# Patient Record
Sex: MALE | Race: WHITE | NOT HISPANIC OR LATINO | Employment: STUDENT | URBAN - METROPOLITAN AREA
[De-identification: names, ages, dates, MRNs, and addresses within clinical notes are randomized per-mention and may not be internally consistent; named-entity substitution may affect disease eponyms.]

---

## 2021-03-28 ENCOUNTER — TRANSFERRED RECORDS (OUTPATIENT)
Dept: HEALTH INFORMATION MANAGEMENT | Facility: CLINIC | Age: 21
End: 2021-03-28

## 2021-03-30 ENCOUNTER — TRANSFERRED RECORDS (OUTPATIENT)
Dept: HEALTH INFORMATION MANAGEMENT | Facility: CLINIC | Age: 21
End: 2021-03-30

## 2021-04-15 ENCOUNTER — TELEPHONE (OUTPATIENT)
Dept: NEUROLOGY | Facility: CLINIC | Age: 21
End: 2021-04-15

## 2021-04-16 NOTE — TELEPHONE ENCOUNTER
I cannot make this determination -no outside records or any records in the system to review. We don't see for concussion but for headaches. As long as patient has some headache or headache variants-it seems Ok. However-I cannot guaranteed that patient may not get what he expects to get without reviewing his records.     Thank you

## 2021-04-20 ENCOUNTER — PRE VISIT (OUTPATIENT)
Dept: NEUROLOGY | Facility: CLINIC | Age: 21
End: 2021-04-20

## 2021-04-20 NOTE — TELEPHONE ENCOUNTER
FUTURE VISIT INFORMATION      FUTURE VISIT INFORMATION:    Date: 4/23/2021    Time: 9am    Location: [facility]  REFERRAL INFORMATION:    Referring provider:  Self     Referring providers clinic:      Reason for visit/diagnosis  Migraines     RECORDS REQUESTED FROM:       Clinic name Comments Records Status Imaging Status   [facility]  Requested  Requested                                   4/20/2021-Request for records faxed to [facility]-MR @ 1216pm    4/22/2021-2nd Request for Records and Images faxed to [facility]-MR @ 745am    4/23/2021-Records from [facility] to Chart-MR @ 640am

## 2021-04-23 ENCOUNTER — VIRTUAL VISIT (OUTPATIENT)
Dept: NEUROLOGY | Facility: CLINIC | Age: 21
End: 2021-04-23
Payer: COMMERCIAL

## 2021-04-23 DIAGNOSIS — R51.9 HEADACHE DISORDER: ICD-10-CM

## 2021-04-23 DIAGNOSIS — Z86.79 HISTORY OF PAROXYSMAL SUPRAVENTRICULAR TACHYCARDIA: ICD-10-CM

## 2021-04-23 DIAGNOSIS — R07.9 CHEST PAIN, UNSPECIFIED TYPE: Primary | ICD-10-CM

## 2021-04-23 PROCEDURE — 99205 OFFICE O/P NEW HI 60 MIN: CPT | Mod: 95 | Performed by: NURSE PRACTITIONER

## 2021-04-23 NOTE — PROGRESS NOTES
Titus is a 20 year old who is being evaluated via a billable video visit.      How would you like to obtain your AVS? [employer]  If the video visit is dropped, the invitation should be resent by: [phone]  Will anyone else be joining your video visit? no      Video Start Time: 9:20 AM  Video-Visit Details    Type of service:  Video Visit    Video End Time:10:19 AM    Originating Location (pt. Location): Home    Distant Location (provider location):  [facility] NEUROLOGY [facility]     Platform used for Video Visit: [employer]      ASSESSMENT AND PLAN:  Severe episodic headaches x3 in [id number] associated with  chest pain and one headache had chest pain and left arm numbness. Headache was to the back and left. 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  Differentials are migraine vs cardiogenic.    History of concussions and headaches in the past but less severe and would resolve in the day with tylenol but nothing as severe as his headache episodes in May and Nov 2020 and most recent in March 2021. ED evaluation negative. Brain MRI presumed normal but no images available for review    Plan:  Cardiology evaluation  Obtain images -brain MRI from outside facility   Follow up after cardiology visit       Subjective   Titus is a 20 year old who presents for the following health issues -headache   HPI   Onset 6-7 years ago. Concussion playing baseball and had to get treatment. More concussions in HS. Has been to functional neurology center in [place]. Concussion from playing [employer] Feb 2020 and had to take a semester off -[employer]. Back to school and almost 4 weeks.   Headache onset after last concussion. May of 2020 developed a severe migraine headache-2 months after done treatment for a concussion. Reports that headache was in the back of his head-sharp and worse with moving around. Associated with photophobia. Another migraine headache in Nov 2020 but worse than in May +spinning sensation, severe  headache in back, chest pain with laying down and up and constant and everything lasted for 5 days and went to  in [place], COVID19 was negative as other evaluations.   Last third headache a month ago and even worse 10-12 days -headache+spinning in the dark +imbalance+left side numbness+blurry vision on and off (wears contacts)+ midsternal chest pain (no SOB or palpitation).  Would wake up with a light version and than would progress as the day goes on. No speech problems. No ataxia. No vertigo.   Went to [facility] ED -sumatriptan, prochlorperazine. Helped a little bit but caused tremor and jittery. Benadryl did not help.   No triggers indentified    Sleeps 8-9 hours  Stress is manageable  Caffeine -may be 2-4 times per month  Drinks a ton of water 15 glasses/day or mild   Exercise -basketball 3 times per week     Last ophthalmology evaluation-[place] break 2020 and normal    FH  Maternal Aunt -migraine headaches  No family history of heart disease  Brain tumor cousin      PMH:  Head injuries and concussions in HS. Last one in college  No pertinent history     PSH:  No pertinent     Allergie-none    Current meds -reviewed      Review of Systems   Constitutional, HEENT, cardiovascular, pulmonary, gi and gu systems are negative, except as otherwise noted.      Objective       Vitals:  No vitals were obtained today due to virtual visit.    Physical Exam   GENERAL: Healthy, alert and no distress, no headache today   EYES: Eyes grossly normal to inspection.  No discharge or erythema, or obvious scleral/conjunctival abnormalities.  RESP: No audible wheeze, cough, or visible cyanosis.  No visible retractions or increased work of breathing.    SKIN: Visible skin clear.   NEURO: Cranial nerves grossly intact.  Mentation and speech appropriate for age.  PSYCH: Mentation appears normal, affect normal/bright, judgement and insight intact, normal speech and appearance well-groomed.    I discussed all my recommendations with  Titus Wen who verbalizes understanding and comfortable with the plan.  All of patient's questions were answered from the best of my knowledge.  Patient is in agreement with the plan.     60 minutes spent on the date of the encounter doing chart review, history, plan, exam, documentation and further activities as noted above    HORTENSIA Flores, CNP [facility]  Headache certified  [facility]

## 2021-04-23 NOTE — LETTER
4/23/2021       RE: Titus Wen  [address] [address] [facility] [address]     Dear Colleague,    Thank you for referring your patient, Titus Wen, to the [facility] NEUROLOGY CLINIC [place] [facility]. Please see a copy of my visit note below.    Titus is a 20 year old who is being evaluated via a billable video visit.      How would you like to obtain your AVS? mychart  If the video visit is dropped, the invitation should be resent by: [phone]  Will anyone else be joining your video visit? no      Video-Visit Details    Type of service:  Video Visit    Video Start Time: 9:20 AM    Video End Time:10:19 AM    Originating Location (pt. Location): Home    Distant Location (provider location):  [facility] NEUROLOGY [facility]     Platform used for Video Visit: [employer]      ASSESSMENT AND PLAN:  Severe episodic headaches x3 in [id number] associated with  chest pain and one headache had chest pain and left arm numbness. Headache was to the back and left. 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  Differentials are migraine vs cardiogenic.    History of concussions and headaches in the past but less severe and would resolve in the day with tylenol but nothing as severe as his headache episodes in May and Nov 2020 and most recent in March 2021. ED evaluation negative. Brain MRI presumed normal but no images available for review    Plan:  Cardiology evaluation  Obtain images -brain MRI from outside facility   Follow up after cardiology visit       Subjective   Titus is a 20 year old who presents for the following health issues -headache   HPI   Onset 6-7 years ago. Concussion playing baseball and had to get treatment. More concussions in HS. Has been to functional neurology center in [place]. Concussion from playing [employer] Feb 2020 and had to take a semester off -[employer]. Back to school and almost 4 weeks.    Headache onset after last concussion. May of 2020 developed a severe migraine headache-2 months after done treatment for a concussion. Reports that headache was in the back of his head-sharp and worse with moving around. Associated with photophobia. Another migraine headache in Nov 2020 but worse than in May +spinning sensation, severe headache in back, chest pain with laying down and up and constant and everything lasted for 5 days and went to  in [place], COVID19 was negative as other evaluations.   Last third headache a month ago and even worse 10-12 days -headache+spinning in the dark +imbalance+left side numbness+blurry vision on and off (wears contacts)+ midsternal chest pain (no SOB or palpitation).  Would wake up with a light version and than would progress as the day goes on. No speech problems. No ataxia. No vertigo.   Went to [facility] ED -sumatriptan, prochlorperazine. Helped a little bit but caused tremor and jittery. Benadryl did not help.   No triggers indentified    Sleeps 8-9 hours  Stress is manageable  Caffeine -may be 2-4 times per month  Drinks a ton of water 15 glasses/day or mild   Exercise -basketball 3 times per week     Last ophthalmology evaluation-[place] break 2020 and normal    FH  Maternal Aunt -migraine headaches  No family history of heart disease  Brain tumor cousin      PMH:  Head injuries and concussions in HS. Last one in college  No pertinent history     PSH:  No pertinent     Allergie-none    Current meds -reviewed      Review of Systems   Constitutional, HEENT, cardiovascular, pulmonary, gi and gu systems are negative, except as otherwise noted.      Objective       Vitals:  No vitals were obtained today due to virtual visit.    Physical Exam   GENERAL: Healthy, alert and no distress, no headache today   EYES: Eyes grossly normal to inspection.  No discharge or erythema, or obvious scleral/conjunctival abnormalities.  RESP: No audible wheeze, cough, or visible cyanosis.   No visible retractions or increased work of breathing.    SKIN: Visible skin clear.   NEURO: Cranial nerves grossly intact.  Mentation and speech appropriate for age.  PSYCH: Mentation appears normal, affect normal/bright, judgement and insight intact, normal speech and appearance well-groomed.    I discussed all my recommendations with Titus Wen who verbalizes understanding and comfortable with the plan.  All of patient's questions were answered from the best of my knowledge.  Patient is in agreement with the plan.     60 minutes spent on the date of the encounter doing chart review, history, plan, exam, documentation and further activities as noted above    HORTENSIA Flores, CNP [facility]  Headache certified  [facility]

## 2021-05-03 NOTE — PROGRESS NOTES
"Cardiology Clinic Note    Assessment and Plan:  Patient is a 19 y/o M with PMH concussions (playing baseball, [employer] Feb 2020 followed by functional neurology center in [place]), severe headache who is presenting to clinic for evaluation of chest pain associated with severe headache.     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     Severe episodic headaches x3 in [id number] associated with chest pain and one headache had chest pain and left arm numbness.   Headache was to the back and left.  His tory of concussions and headaches in the past but less severe and would resolve in the day with tylenol but nothing as severe as his headache episodes in May and Nov 2020 and most recent in March 2021. Brain MRI presumed normal but no images available.     Patient was evaluated by neurology for headache.   On one occasion when having headache he also experienced chest pain with laying down and up, constant, lasting for 5 days. Patient denied SOB or palpitation.     FH  Maternal Aunt -migraine headaches  No family history of heart disease  Brain tumor cousin        PMH:  SVT in childhood but outgrown.  Head injuries and concussions in HS. Last one in college  No pertinent history      PSH:  No pertinent      Allergie-none     Current Rx        Review of Systems   Constitutional, HEENT, cardiovascular, pulmonary, gi and gu systems are negative, except as otherwise noted.        Objective       Vitals:  BP (!) 143/85 (BP Location: Right arm, Patient Position: Chair, Cuff Size: Adult Regular)   Pulse 69   Ht 1.913 m (6' 3.3\")   Wt 79.2 kg (174 lb 9.6 oz)   SpO2 97%   BMI 21.65 kg/m  "     Physical Exam   GENERAL: Healthy, alert and no distress, no headache today   EYES: Eyes grossly normal to inspection.  No discharge or erythema, or obvious scleral/conjunctival abnormalities.  RESP: No audible wheeze, cough, or visible cyanosis.  No visible retractions or increased work of breathing.    SKIN: Visible skin clear. No significant rash, abnormal pigmentation or lesions.  NEURO: Cranial nerves grossly intact.  Mentation and speech appropriate for age.  PSYCH: Mentation appears normal, affect normal/bright, judgement and insight intact, normal speech and appearance well-groomed.       EKG 5/5/2021  NSR, normal axis, good R wave progression, twi in III        Answers for HPI/ROS submitted by the patient on 5/2/2021   General Symptoms: No  Skin Symptoms: No  HENT Symptoms: No  EYE SYMPTOMS: No  HEART SYMPTOMS: No  LUNG SYMPTOMS: No  INTESTINAL SYMPTOMS: No  URINARY SYMPTOMS: No  REPRODUCTIVE SYMPTOMS: No  SKELETAL SYMPTOMS: No  BLOOD SYMPTOMS: No  NERVOUS SYSTEM SYMPTOMS: No  MENTAL HEALTH SYMPTOMS: No

## 2021-05-05 ENCOUNTER — OFFICE VISIT (OUTPATIENT)
Dept: CARDIOLOGY | Facility: CLINIC | Age: 21
End: 2021-05-05
Attending: NURSE PRACTITIONER
Payer: COMMERCIAL

## 2021-05-05 ENCOUNTER — PRE VISIT (OUTPATIENT)
Dept: CARDIOLOGY | Facility: CLINIC | Age: 21
End: 2021-05-05

## 2021-05-05 VITALS
BODY MASS INDEX: 21.71 KG/M2 | HEIGHT: 75 IN | OXYGEN SATURATION: 97 % | WEIGHT: 174.6 LBS | DIASTOLIC BLOOD PRESSURE: 85 MMHG | SYSTOLIC BLOOD PRESSURE: 143 MMHG | HEART RATE: 69 BPM

## 2021-05-05 DIAGNOSIS — R07.9 CHEST PAIN, UNSPECIFIED TYPE: ICD-10-CM

## 2021-05-05 DIAGNOSIS — Z86.79 HISTORY OF PAROXYSMAL SUPRAVENTRICULAR TACHYCARDIA: ICD-10-CM

## 2021-05-05 LAB — INTERPRETATION ECG - MUSE: NORMAL

## 2021-05-05 PROCEDURE — 93005 ELECTROCARDIOGRAM TRACING: CPT

## 2021-05-05 PROCEDURE — 99203 OFFICE O/P NEW LOW 30 MIN: CPT | Mod: GC | Performed by: INTERNAL MEDICINE

## 2021-05-05 PROCEDURE — G0463 HOSPITAL OUTPT CLINIC VISIT: HCPCS | Mod: 25

## 2021-05-05 SDOH — HEALTH STABILITY: MENTAL HEALTH: HOW OFTEN DO YOU HAVE 6 OR MORE DRINKS ON ONE OCCASION?: NEVER

## 2021-05-05 SDOH — HEALTH STABILITY: MENTAL HEALTH: HOW OFTEN DO YOU HAVE A DRINK CONTAINING ALCOHOL?: NEVER

## 2021-05-05 SDOH — HEALTH STABILITY: MENTAL HEALTH: HOW MANY STANDARD DRINKS CONTAINING ALCOHOL DO YOU HAVE ON A TYPICAL DAY?: NOT ASKED

## 2021-05-05 ASSESSMENT — MIFFLIN-ST. JEOR: SCORE: 1892.37

## 2021-05-05 ASSESSMENT — PAIN SCALES - GENERAL: PAINLEVEL: NO PAIN (0)

## 2021-05-05 NOTE — NURSING NOTE
Chief Complaint   Patient presents with     New Patient     Chest pain      Vitals were taken and medications where reconciled. EKG was performed   ROSA Ibarra  8:03 AM

## 2021-05-05 NOTE — LETTER
5/5/2021      RE: Titus Wen  [address] [address] [address] [facility] [address]       Dear Colleague,    Thank you for the opportunity to participate in the care of your patient, Titus Wen, at the [facility] HEART CLINIC [place] [facility]. Please see a copy of my visit note below.    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     Chest pain   - No risk factors for CAD  chest pain appears non cardiac  Will perform TTE (has hx of SVT in his 1st year of life)    HPI   Patient is a 19 y/o M with PMH concussions (playing baseball, [employer] Feb 2020 followed by functional neurology center in [place]), severe headache who is presenting to clinic for evaluation of chest pain associated with severe headache.     Severe episodic headaches x3 in 8822-4774 associated with chest pain and one headache had chest pain and left arm numbness.   Headache was to the back and left.  His tory of concussions and headaches in the past but less severe and would resolve in the day with tylenol but nothing as severe as his headache episodes in May and Nov 2020 and most recent in March 2021. Brain MRI presumed normal but no images available.     Patient was evaluated by neurology for headache.   On one occasion when having headache he also experienced chest pain with laying down and up, constant, lasting for 5 days. Patient denied SOB or palpitation.     FH  Maternal Aunt -migraine headaches  No family history of heart disease  Brain tumor cousin        PMH:  SVT in childhood but outgrown.  Head injuries and concussions in HS. Last one in college  No pertinent history      PSH:  No pertinent      Allergie-none     Current  "Rx        Review of Systems   Constitutional, HEENT, cardiovascular, pulmonary, gi and gu systems are negative, except as otherwise noted.        Objective       Vitals:  BP (!) 143/85 (BP Location: Right arm, Patient Position: Chair, Cuff Size: Adult Regular)   Pulse 69   Ht 1.913 m (6' 3.3\")   Wt 79.2 kg (174 lb 9.6 oz)   SpO2 97%   BMI 21.65 kg/m      Physical Exam   GENERAL: Healthy, alert and no distress, no headache today   EYES: Eyes grossly normal to inspection.  No discharge or erythema, or obvious scleral/conjunctival abnormalities.  RESP: No audible wheeze, cough, or visible cyanosis.  No visible retractions or increased work of breathing.    SKIN: Visible skin clear. No significant rash, abnormal pigmentation or lesions.  NEURO: Cranial nerves grossly intact.  Mentation and speech appropriate for age.  PSYCH: Mentation appears normal, affect normal/bright, judgement and insight intact, normal speech and appearance well-groomed.       EKG 5/5/2021  NSR, normal axis, good R wave progression, twi in III        Answers for HPI/ROS submitted by the patient on 5/2/2021   General Symptoms: No  Skin Symptoms: No  HENT Symptoms: No  EYE SYMPTOMS: No  HEART SYMPTOMS: No  LUNG SYMPTOMS: No  INTESTINAL SYMPTOMS: No  URINARY SYMPTOMS: No  REPRODUCTIVE SYMPTOMS: No  SKELETAL SYMPTOMS: No  BLOOD SYMPTOMS: No  NERVOUS SYSTEM SYMPTOMS: No  MENTAL HEALTH SYMPTOMS: No      Attestation signed by Ghulam Malhotra MD at 5/5/2021  8:48 AM:  Physician Attestation   I, Ghulam Malhotra MD, saw this patient with the resident and agree with the resident/fellow's findings and plan of care as documented in the note.      I personally reviewed vital signs, medications, labs, and imaging.    Key findings: 20 year old with a history of SVT as an infant resolved at 1 year of age, no other cardiac disease, highly active athlete, plays basketball, has a history of multiple concussions and resulting migraines. He has " been having increasing migraines recently with associated chest pain. Chest pain lasting for days with the migraines, not related to activity or exertion, no palpitations or tachycardia. He has no pertinent social or family history, no history of sudden death or death at early age in the family.This is non cardiac pain and unrelated to coronary pathology. His EKG is normal. Given that he does have a history of SVT and symptoms, we will obtain a resting echocardiogram.    Ghulam Malhotra MD  Date of Service (when I saw the patient): 05/05/21

## 2021-05-05 NOTE — PATIENT INSTRUCTIONS
Patient Instructions:  It was a pleasure to see you in the cardiology clinic today.      If you have any questions, call  Aditi Yanez RN, at [phone].  Press Option #1 for the [facility], and then press Option #4  We are encouraging the use of TVTYhart to communicate with your HealthCare Provider    Note the new medications: none  Stop the following medications: none    The results from today include: pending ECHO  Please follow up with phone call from nurse      If you have an urgent need after hours (8:00 am to 4:30 pm) please call [phone] and ask for the cardiology fellow on call.

## 2021-05-10 NOTE — PATIENT INSTRUCTIONS
Plan:  Cardiology evaluation  Obtain images -brain MRI from outside facility   Follow up after cardiology visit

## 2021-05-29 ENCOUNTER — HEALTH MAINTENANCE LETTER (OUTPATIENT)
Age: 21
End: 2021-05-29

## 2021-09-18 ENCOUNTER — HEALTH MAINTENANCE LETTER (OUTPATIENT)
Age: 21
End: 2021-09-18

## 2022-06-25 ENCOUNTER — HEALTH MAINTENANCE LETTER (OUTPATIENT)
Age: 22
End: 2022-06-25

## 2022-11-19 ENCOUNTER — HEALTH MAINTENANCE LETTER (OUTPATIENT)
Age: 22
End: 2022-11-19

## 2023-07-02 ENCOUNTER — HEALTH MAINTENANCE LETTER (OUTPATIENT)
Age: 23
End: 2023-07-02